# Patient Record
Sex: MALE | Race: WHITE | URBAN - METROPOLITAN AREA
[De-identification: names, ages, dates, MRNs, and addresses within clinical notes are randomized per-mention and may not be internally consistent; named-entity substitution may affect disease eponyms.]

---

## 2021-11-09 ENCOUNTER — TELEPHONE (OUTPATIENT)
Dept: CARDIOLOGY | Age: 36
End: 2021-11-09

## 2021-11-09 NOTE — TELEPHONE ENCOUNTER
Spoke with patient and confirmed nuclear stress test appointment on Nov.11, 2021 at 0830. Reviewed instructions, and COVID-19 preprocedure checklist, confirmed medications, and questions answered.

## 2021-11-11 ENCOUNTER — HOSPITAL ENCOUNTER (OUTPATIENT)
Dept: CARDIOLOGY | Age: 36
Discharge: HOME | End: 2021-11-11
Payer: COMMERCIAL

## 2021-11-11 VITALS
DIASTOLIC BLOOD PRESSURE: 80 MMHG | WEIGHT: 285 LBS | SYSTOLIC BLOOD PRESSURE: 120 MMHG | BODY MASS INDEX: 43.19 KG/M2 | HEIGHT: 68 IN | HEART RATE: 73 BPM

## 2021-11-11 DIAGNOSIS — R07.9 CHEST PAIN, UNSPECIFIED TYPE: Primary | ICD-10-CM

## 2021-11-11 LAB
LV EF: 60 %
LVEF MODALITY: NORMAL

## 2021-11-11 PROCEDURE — 2580000003 HC RX 258: Performed by: INTERNAL MEDICINE

## 2021-11-11 PROCEDURE — 78452 HT MUSCLE IMAGE SPECT MULT: CPT

## 2021-11-11 PROCEDURE — 3430000000 HC RX DIAGNOSTIC RADIOPHARMACEUTICAL: Performed by: INTERNAL MEDICINE

## 2021-11-11 PROCEDURE — 93017 CV STRESS TEST TRACING ONLY: CPT

## 2021-11-11 PROCEDURE — A9500 TC99M SESTAMIBI: HCPCS | Performed by: INTERNAL MEDICINE

## 2021-11-11 RX ORDER — SODIUM CHLORIDE 0.9 % (FLUSH) 0.9 %
10 SYRINGE (ML) INJECTION PRN
Status: DISCONTINUED | OUTPATIENT
Start: 2021-11-11 | End: 2021-11-12 | Stop reason: HOSPADM

## 2021-11-11 RX ORDER — ROSUVASTATIN CALCIUM 10 MG/1
10 TABLET, COATED ORAL DAILY
COMMUNITY
Start: 2021-11-08

## 2021-11-11 RX ORDER — SERTRALINE HYDROCHLORIDE 25 MG/1
25 TABLET, FILM COATED ORAL DAILY
COMMUNITY
Start: 2021-11-04

## 2021-11-11 RX ORDER — METFORMIN HYDROCHLORIDE 500 MG/1
500 TABLET, EXTENDED RELEASE ORAL 2 TIMES DAILY
COMMUNITY
Start: 2021-11-08

## 2021-11-11 RX ADMIN — Medication 10 MILLICURIE: at 08:38

## 2021-11-11 RX ADMIN — SODIUM CHLORIDE, PRESERVATIVE FREE 10 ML: 5 INJECTION INTRAVENOUS at 08:39

## 2021-11-11 RX ADMIN — Medication 30.2 MILLICURIE: at 11:44

## 2021-11-11 RX ADMIN — SODIUM CHLORIDE, PRESERVATIVE FREE 10 ML: 5 INJECTION INTRAVENOUS at 11:44

## 2021-11-11 NOTE — PROCEDURES
[address] [facility] [address]  [phone]                Exercise Stress Nuclear Gated SPECT Study    Name: [facility] Account Number: [de-identified]    :  1985      Sex: male              Date of Study:  2021    Height: 5' 8\" (172.7 cm)  Weight: 285 lb (129.3 kg)     Ordering Provider: Meghann Spencer MD          PCP: Meghann Spencer MD      Cardiologist: none                        Interpreting Physician: Mellisa Duke, DO  _________________________________________________________________________________    Indication:   Detecting the presence and location of coronary artery disease    Clinical History:   Patient has no known history of coronary artery disease. Resting ECG:    Normal sinus rhythm and Nonspecific ST-T wave changes    Exercise: The patient exercised using a Geovanny protocol, completing 8:00 minutes and reaching an estimated work load of 62.9 metabolic equivalents (METS). Resting HR was 73. Peak exercise heart rate was 172 ( 93% of maximum predicted heart rate for age). Baseline /80. Peak exercise /84. The blood pressure response to exercise was normal      Exercise was terminated due to heart rate attained, leg fatigue. The patient experienced no chest pain with exercise. Exercise ECG:   The patient demonstrated rare PVC  during exercise. With exercise, there were no ST segment changes of significance at the heart rate achieved. Yang treadmill score was 8 implying low risk. IMAGING: Myocardial perfusion imaging was performed at rest 30-35 minutes following the intravenous injection of 10.0 mCi of (Tc-Sestamibi) followed by 10 ml of Normal Saline. At peak exercise, the patient was injected intravenously with 30.2 mCi of (Tc-Sestamibi) followed by 10 ml of Normal Saline. Gated post-stress tomographic imaging was performed 20-25 minutes after stress. FINDINGS: The overall quality of the study was good. Left ventricular cavity size was noted to be normal.    Rotational analog analysis demonstrated soft tissue diaphragmatic attenuation. The gated SPECT stress imaging in the short, vertical long, and horizontal long axis demonstrated normal homogeneous tracer distribution throughout the myocardium. The resting images are normal.     Gated SPECT left ventricular ejection fraction was calculated to be 60%, with normal myocardial thickening and wall motion. Impression:    1. Exercise EKG was negative. 2. The patient experienced no chest pain with exercise. 3. The myocardial perfusion imaging was normal with attenuation artifact. 4. Overall left ventricular systolic function was normal without regional wall motion abnormalities. 5. Yang treadmill score was 8 implying low risk. 6. Exercise capacity was average. 7. Low risk general exercise treadmill test.    Thank you for sending your patient to this [employer].      Electronically signed by Pasqual Barthel, DO on 11/11/21 at 4:34 PM EST

## 2021-12-07 ENCOUNTER — HOSPITAL ENCOUNTER (OUTPATIENT)
Dept: DIABETES SERVICES | Age: 36
Setting detail: THERAPIES SERIES
Discharge: HOME | End: 2021-12-07
Payer: COMMERCIAL

## 2021-12-07 PROCEDURE — G0108 DIAB MANAGE TRN  PER INDIV: HCPCS

## 2021-12-07 SDOH — ECONOMIC STABILITY: FOOD INSECURITY: ADDITIONAL INFORMATION: NO

## 2021-12-07 ASSESSMENT — PROBLEM AREAS IN DIABETES QUESTIONNAIRE (PAID)
FEELING THAT DIABETES IS TAKING UP TOO MUCH OF YOUR MENTAL AND PHYSICAL ENERGY EVERY DAY: 0
WORRYING ABOUT THE FUTURE AND THE POSSIBILITY OF SERIOUS COMPLICATIONS: 2
FEELING SCARED WHEN YOU THINK ABOUT LIVING WITH DIABETES: 2
FEELING DEPRESSED WHEN YOU THINK ABOUT LIVING WITH DIABETES: 1
PAID-5 TOTAL SCORE: 6
COPING WITH COMPLICATIONS OF DIABETES: 1

## 2021-12-07 NOTE — PROGRESS NOTES
Diabetes Self-Management Education Record    Participant Name: Ziggy Chandler  Referring Provider: Smiley Jimenez MD  Assessment/Evaluation Ratings:  1=Needs Instruction   4=Demonstrates Understanding/Competency  2=Needs Review   NC=Not Covered    3=Comprehends Key Points  N/A=Not Applicable  Topics/Learning Objectives Pre-session Assess Date:  Instructor initials/date  12/7/21 Pretty Mcclendon. Date    Instructor initials/date  12/7/21 DAPHNIE Follow-up Post- session Eval Comments   Diabetes disease process & Treatment process:   -Define type of diabetes in simple terms.  - Describe the ABCs of  diabetes management  -Identify own type of diabetes  -Identify lifestyle changes/treatment options  -other:  1 [x] All     []  []  []  []  []  4 12/7/21 [address] [place]  Patient has been helping his mother with managing her diabetes for years. Developing strategies for Healthy coping/psychosocial issues:    -Describe feelings about living with diabetes  -Identify coping strategies and sources of stress  -Identify support needed & support network available  -Complete PAID-5 Diabetes questionnaire 1 [x] All     []  []  []    []  4 12/7/21 [address]  Patient has a very supportive wife. Prevention, detection & treatment of Chronic complications:    -Identify the prevention, detection and treatment for complications including immunizations, preventive eye, foot, dental and renal exams as indicated per the participant's duration of diabetes and health status.  -Define the natural course of diabetes and the relationship of blood glucose levels to long term complications of diabetes. 1 [x] All     []            []  4 12/7/21 DAPHNIE  Patient will start checking his feet.    Prevention, detection & treatment of acute complications:    -State the causes,signs & symptoms of hyper & hypoglycemia, and prevention & treatment strategies.   -Describe sick day guidelines  DKA /indications for ketone testing &  when to call physician  1 []      [x]      []    3 12/7/21 DAPHNIE  Patient had noticed increased thirst before diagnosis of DM.             -Identify severe weather/situation crisis  & diabetes supplies management  []      Using medications safely:   -State effects of diabetes medicines on blood glucose levels;  -List diabetes medication taken, action & side effects 1 [x] All     []  []  4 12/7/21 [address] [facility]  Patient taking Metformin, was taking after meals and is experiencing adverse GI issues (diarrhea) - patient's doctor wants patient to increase to 4 pills a day and patient has not increased yet because of side effects. Advised patient to take with first bite of food and to have a bigger breakfast to help with side effects, talk to doctor if diarrhea persists. Insulin/Injectables/glucagon  -Name appropriate injection sites; proper storage; supplies needed;  n/a   []       Demonstrate proper technique  []      Monitoring blood glucose, interpreting and using results:   -Identify the purpose of testing   -Identify recommended & personal blood glucose targets & HgbA1C target levels  -State the Importance of logging blood glucose levels for pattern recognition;   -State benefits of reading/using pt generated health data  -Verbalize safe lancet disposal 1 [x] All     []  []    []  []  []  4 12/7/21 [address] [facility]  Patient checking blood glucose when he wakes up and before he goes to work. Average blood glucose readings from 180-240. Target ranges reviewed, given logs to write down meter readings to bring to doctor's appointments. Reviewed proper testing technique with patient, verbalized understanding.   -Demonstrate proper testing technique  []      Incorporating physical activity into lifestyle:   -State effect of exercise on blood glucose levels;   -State benefits of regular exercise;   -Define safety considerations/food choices if needed.  -Describe contraindications/maintenance of activity.  1 [x] All     []  []    []  []  4 12/7/21 DAPHNIE  Patient golfs and plays basketball when the weather is nice. Has a sedentary job, will go to [employer] to use the  Treadmill 1-2 times a week for 30 minutes. Educated patient on importance of increasing physical activity as able and recommendation of 150 minutes per week. Incorporating nutritional management into lifestyle:   -Describe effect of type, amount & timing of food on blood glucose  -Describe methods for preparing and planning healthy meals  -Correctly read food labels  -Name 3 foods high in Carbohydrate 1 [x] All       []    []    []  []  4 12/7/21 [address]  Reviewed nutritional recommendations for salt, added sugar, fats, and fiber. Reviewed plate method and portion control. Reviewed which foods contain carbohydrates, how carbs impact blood glucose, how to count carbs, and how to spread carbs evenly throughout the day. Label reading reviewed. Patient very attentive to education and demonstrated understanding. Asked questions and took notes.   -Plan a carbohydrate-controlled meal based on individualized meal plan  -Demonstrate CHO counting/portion control  1 [x]  [x]  4 12/7/21 [address]   Educated patient on 45-60 grams of carbs at meals and 15-30 grams of carbs at snacks. Patient able to plan example meals and snacks to meet recommendations. Developing strategies for problem solving to promote health/change behavior. -Identify 7 self-care behaviors; Personal health risk factors; Benefits, challenges & strategies for behavioral change and set an individualized goal selection.  1       [x]  4 12/6/21 DAPHNIE   [x]Nutrition - I will use the plate method at each meal to reduce my portions  []Monitoring  []Exercise  []Medication  []Other     Identified Barriers to learning/adherence to self management plan:    None  []  other    Instruction Method:  Lecture/Discussion, Handouts and Return demonstration     Supporting Education Materials/Equipment Provided: Self-management manual and Nutritional Packet   []Irish materials       [] services []Other:      Encounter Type Date Attended Start Time End Time Comments No Show Dates   Assessment 12/7/21 DAPHNIE         Session 1         Session 2        1:1 DSMES  12/7/21  [id number]   In person    In person Follow-up         Gestational Diabetes         Individual MNT        Meter Instrx        Insulin Instrx           Additional Comments: [x] Pt seen individually due to Covid-19 Safety precautions and no group session available.         Date:   Follow-up goal attainment based on patients initial DSMES goal    Dr Notified by [] EMR []Fax        []Post class Hgb A1C  []Medication compliance   [x]Plate method/meal plan compliance   []Able to state the number of Carbohydrate servings eaten at B,L,D   []Testing blood glucose as prescribed by PCP   []Exercise Routine   []Other:   []Other:     []Patient lost to follow-up  Dr Notified by []EMR []Fax     Personal Support Plan:      [x] Keep all scheduled doctor appointments   [] Make and keep appointments with specialists (foot, eye, dentist) as recommended   [] Consult my pharmacist about all new medications or to ask any medication questions   [] Get tested for sleep apnea   [] Seek help for:   [] Make an appointment with:   [] Attend smoking cessation classes or call 1-800-QUIT-NOW  [] Attend Diabetes Support Group   [] Use diabetes magazines, books, or credible web-sites like the ADA for more information  [] Increase exercise at home or join an exercise program:   [] Other:

## 2021-12-07 NOTE — LETTER
[facility]- Diabetes Education Department Initial Diabetes Education Letter    2021       Re:     Jelly Hammer         :  1985  Dear Dr. Crissy Nettles: Thank you for referring your patient, Jelly Hammer, for diabetes education. Jelly Hammer has completed their comprehensive education plan today which included the topics below:    Nursing/Medical [x]      Nutrition [x]  [] Diabetes disease process & treatment   [] Diabetes medication use and safety   [] Self-monitoring blood glucose/interpreting results  [] Prevention, detection and treatment of acute complications  [] Prevention, detection and treatment of chronic complications  [] Developing strategies to address psychosocial issues    [] Nutritional management: basic principles   [] Carbohydrate counting, plate method, label reading  [] Benefits of/ways to incorporate physical activity  [] Problem solving and preparing for crisis situations  [] Diabetes supply management  [] Goal setting and behavior modification         PAID -5 (Problem Areas in Diabetes) Survey Results  6  A total score of 8 or greater indicates possible diabetes related emotional distress which warrants further assessment.  [] [address]  and Crisis Resource information provided. Comments: Patient very attentive to education and demonstrated understanding. Asked questions and took notes. PATIENT SELECTED GOAL:   [x]  I will follow my meal plan and measure my carbohydrate foods. []  I will increase my activity to:  []  I will check my blood glucose as ordered by my doctor. []  I will take my medications at the correct times as ordered by my doctor.   []  Other:      DIABETES SELF-MANAGEMENT SUPPORT PLAN/REFERRALS (patient identified):  [x] Keep my scheduled visits with my doctor   [] Make and keep appointments with specialists (foot, eye, dentist) as recommended  [] Consult my pharmacist with all new medications and/or any

## 2022-03-11 ENCOUNTER — TELEPHONE (OUTPATIENT)
Dept: DIABETES SERVICES | Age: 37
End: 2022-03-11